# Patient Record
Sex: FEMALE | Race: WHITE | NOT HISPANIC OR LATINO | Employment: OTHER | URBAN - METROPOLITAN AREA
[De-identification: names, ages, dates, MRNs, and addresses within clinical notes are randomized per-mention and may not be internally consistent; named-entity substitution may affect disease eponyms.]

---

## 2023-12-05 ENCOUNTER — OFFICE VISIT (OUTPATIENT)
Dept: GASTROENTEROLOGY | Facility: CLINIC | Age: 73
End: 2023-12-05
Payer: MEDICARE

## 2023-12-05 VITALS
DIASTOLIC BLOOD PRESSURE: 82 MMHG | BODY MASS INDEX: 35.88 KG/M2 | WEIGHT: 243 LBS | OXYGEN SATURATION: 97 % | SYSTOLIC BLOOD PRESSURE: 137 MMHG | TEMPERATURE: 97.9 F | HEART RATE: 68 BPM | RESPIRATION RATE: 20 BRPM

## 2023-12-05 DIAGNOSIS — Z12.11 SCREENING FOR MALIGNANT NEOPLASM OF COLON: ICD-10-CM

## 2023-12-05 DIAGNOSIS — Z80.0 FAMILY HISTORY OF PANCREATIC CANCER: Primary | ICD-10-CM

## 2023-12-05 PROCEDURE — 1159F MED LIST DOCD IN RCRD: CPT | Performed by: INTERNAL MEDICINE

## 2023-12-05 PROCEDURE — 99202 OFFICE O/P NEW SF 15 MIN: CPT | Performed by: INTERNAL MEDICINE

## 2023-12-05 PROCEDURE — 1126F AMNT PAIN NOTED NONE PRSNT: CPT | Performed by: INTERNAL MEDICINE

## 2023-12-05 PROCEDURE — 99212 OFFICE O/P EST SF 10 MIN: CPT | Performed by: INTERNAL MEDICINE

## 2023-12-05 PROCEDURE — 1036F TOBACCO NON-USER: CPT | Performed by: INTERNAL MEDICINE

## 2023-12-05 RX ORDER — ZINC SULFATE 50(220)MG
CAPSULE ORAL
COMMUNITY

## 2023-12-05 RX ORDER — CHOLECALCIFEROL (VITAMIN D3) 125 MCG
CAPSULE ORAL
COMMUNITY

## 2023-12-05 RX ORDER — IBUPROFEN 100 MG/5ML
1000 SUSPENSION, ORAL (FINAL DOSE FORM) ORAL DAILY
COMMUNITY

## 2023-12-05 RX ORDER — FOLIC ACID/MULTIVIT,IRON,MINER 0.4MG-18MG
TABLET ORAL
COMMUNITY

## 2023-12-05 ASSESSMENT — ENCOUNTER SYMPTOMS
OCCASIONAL FEELINGS OF UNSTEADINESS: 0
DEPRESSION: 0
LOSS OF SENSATION IN FEET: 0
GASTROINTESTINAL NEGATIVE: 1
CONSTITUTIONAL NEGATIVE: 1

## 2023-12-05 ASSESSMENT — PAIN SCALES - GENERAL: PAINLEVEL: 0-NO PAIN

## 2023-12-05 NOTE — PATIENT INSTRUCTIONS
I recommend an MRI of your pancreas due to your family history of pancreatic cancer.    Please update your colonoscopy or Cologuard in 2 years.

## 2023-12-05 NOTE — PROGRESS NOTES
"Subjective   Patient ID: Michelle Stokes \"Shelia" is a 73 y.o. female who presents for New Patient Visit.  Here for discussion about risk of pancreatic cancer    Patient and sister both had breast cancer - last occurrence 17 years ago  Genetic testing negative  Sister was not treated with conventional therapy and  at age 43  Both parents  of pancreatic cancer 70s and 90s    Colonoscopy  at [facility] and Cologuard negative last year   No family history CRC           Review of Systems   Constitutional: Negative.    Gastrointestinal: Negative.        Objective   Physical Exam  Constitutional:       Appearance: Normal appearance.   Pulmonary:      Effort: Pulmonary effort is normal.   Abdominal:      Palpations: Abdomen is soft.   Neurological:      Mental Status: She is alert.         Assessment/Plan   Problem List Items Addressed This Visit             ICD-10-CM    Screening for malignant neoplasm of colon Z12.11    Family history of pancreatic cancer - Primary Z80.0    Relevant Orders    MR abdomen wo IV contrast          "

## 2023-12-20 ENCOUNTER — HOSPITAL ENCOUNTER (OUTPATIENT)
Dept: RADIOLOGY | Facility: HOSPITAL | Age: 73
Discharge: HOME | End: 2023-12-20
Payer: MEDICARE

## 2023-12-20 DIAGNOSIS — Z80.0 FAMILY HISTORY OF PANCREATIC CANCER: ICD-10-CM

## 2023-12-20 PROCEDURE — 74181 MRI ABDOMEN W/O CONTRAST: CPT

## 2024-05-01 ENCOUNTER — HOSPITAL ENCOUNTER (OUTPATIENT)
Dept: RADIOLOGY | Facility: HOSPITAL | Age: 74
Discharge: HOME | End: 2024-05-01
Payer: MEDICARE

## 2024-05-01 VITALS — WEIGHT: 240 LBS | HEIGHT: 69 IN | BODY MASS INDEX: 35.55 KG/M2

## 2024-05-01 DIAGNOSIS — N95.1 MENOPAUSAL AND FEMALE CLIMACTERIC STATES: ICD-10-CM

## 2024-05-01 DIAGNOSIS — Z12.31 ENCOUNTER FOR SCREENING MAMMOGRAM FOR MALIGNANT NEOPLASM OF BREAST: ICD-10-CM

## 2024-05-01 PROCEDURE — 77067 SCR MAMMO BI INCL CAD: CPT | Mod: LEFT SIDE | Performed by: RADIOLOGY

## 2024-05-01 PROCEDURE — 77067 SCR MAMMO BI INCL CAD: CPT | Mod: LT

## 2024-05-01 PROCEDURE — 77063 BREAST TOMOSYNTHESIS BI: CPT | Mod: LEFT SIDE | Performed by: RADIOLOGY

## 2024-05-01 PROCEDURE — 77080 DXA BONE DENSITY AXIAL: CPT

## 2025-01-21 ENCOUNTER — HOSPITAL ENCOUNTER (OUTPATIENT)
Dept: RADIOLOGY | Facility: CLINIC | Age: 75
Discharge: HOME | End: 2025-01-21
Payer: MEDICARE

## 2025-01-21 DIAGNOSIS — R92.8 ABNORMAL MAMMOGRAM: ICD-10-CM

## 2025-01-21 PROCEDURE — G0279 TOMOSYNTHESIS, MAMMO: HCPCS | Mod: LEFT SIDE | Performed by: STUDENT IN AN ORGANIZED HEALTH CARE EDUCATION/TRAINING PROGRAM

## 2025-01-21 PROCEDURE — 76642 ULTRASOUND BREAST LIMITED: CPT | Mod: LT

## 2025-01-21 PROCEDURE — 77061 BREAST TOMOSYNTHESIS UNI: CPT | Mod: LT

## 2025-01-21 PROCEDURE — 76982 USE 1ST TARGET LESION: CPT | Mod: LT

## 2025-01-21 PROCEDURE — 77065 DX MAMMO INCL CAD UNI: CPT | Mod: LEFT SIDE | Performed by: STUDENT IN AN ORGANIZED HEALTH CARE EDUCATION/TRAINING PROGRAM

## 2025-01-21 PROCEDURE — 76642 ULTRASOUND BREAST LIMITED: CPT | Mod: LEFT SIDE | Performed by: STUDENT IN AN ORGANIZED HEALTH CARE EDUCATION/TRAINING PROGRAM

## 2025-01-22 NOTE — PROGRESS NOTES
"Michelle Stokes female   1950 74 y.o.   [id number]      Chief Complaint  Abnormal breast imaging     History Of Present Illness  Michelle Stokes \"Shelia" is a 74 y.o. female [facility] for [facility] breast imaging and exam. She has a history of right breast cancer with in breast recurrence. She was initially diagnosed in  and treated at [facility] with right breast partial mastectomy and axillary lymph node dissection. Per patient report, 0/-15 lymph nodes removed, all negative. She did not have adjuvant radiation or chemotherapy and took Tamoxifen for five years. In , she had an in breast recurrence (different quadrant) and was treated at [facility] with a completion skin sparing mastectomy (Eduardo) and tissue expander reconstruction with left breast mastopexy for symmetry (Kingsotn). She does report her recurrence was hormone positive, but she did not take endocrine therapy. She did not have adjuvant radiation or chemotherapy with second diagnosis. She returned to the OR for second stage reconstruction with saline implant that eventually was removed and replaced with a silicone implant. Per patient report, she underwent genetic testing with [facility], negative. She had a screening breast ultrasound performed at [employer] (private company) on 2022, negative. She presents today for left breast imaging follow up. She had a [facility] left breast ultrasound that recommended a biopsy.      BREAST IMAGIN2024 LEFT screening mammogram BI-RADS Category 1.  2025 LEFT diagnostic mammogram and ultrasound 2. Benign focal ductal dilatation with debris in the left subareolar region with no definite intraductal mass or suspicious mammographic or sonographic finding in the left breast. The mammographic appearance has been stable since 2018 and likely represent postsurgical changes of mastopexy. Patient denies nipple discharge. Clinical follow-up is recommended.    REPRODUCTIVE HISTORY:menarche age 14, , first birth " age 27,  x 15 months, no OCP's, surgical menopause age 45 (BSO due to cancer diagnosis), no HRT, scattered fibroglandular tissue                                   FAMILY CANCER HISTORY:   Sister: Breast cancer, age 43,   Mother: Pancreatic cancer, age 80  Father: Pancreatic cancer, 90s     Surgical History  She has a past surgical history that includes Other surgical history (10/27/2017); Other surgical history (10/27/2017); Other surgical history (10/27/2017); Other surgical history (10/27/2017); Other surgical history (10/30/2017); Other surgical history (2019); Other surgical history (2019); Other surgical history (2019); Other surgical history (2019); Breast lumpectomy (Right, 1995); Mastectomy (Right, 2005); Augmentation mammaplasty (99735466); Reduction mammaplasty (439041); Breast biopsy ([id number]); and Oophorectomy (60017535).     Social History  She reports that she has never smoked. She has never used smokeless tobacco. She reports that she does not currently use alcohol. She reports that she does not use drugs.    Family History  Family History   Problem Relation Name Age of Onset    Bilateral breast cancer Sister  43    Other (m aunt br cancer) Other          Allergies  Gabapentin, Neomycin, Neomycin-polymyxin-hc, Tetanus antitoxin, Tetanus toxoid, Tetanus vaccines and toxoid, Niacin, and Nickel    Medications  Current Outpatient Medications   Medication Instructions    ascorbic acid (VITAMIN C) 1,000 mg, Daily    ASHWAGANDHA EXTRACT ORAL 2 capsules, Daily    Bacillus coagulans-inulin 1 billion-250 cell-mg capsule as directed Orally    cholecalciferol (Vitamin D-3) 125 MCG (5000 UT) capsule 1 (one) time each day at the same time.    krill-omega-3-dha-epa-lipids 705-35-30-50 mg capsule Take by mouth.    L. acidophilus/Bifid. animalis 15.5 billion cell capsule Take by mouth.    multivit with minerals/lutein (MULTIVITAMIN 50 PLUS ORAL) Take by mouth.     vitamin D3-vitamin K2 1,250-200 mcg capsule Take by mouth.    zinc sulfate (Zincate) 220 (50 Zn) MG capsule Take by mouth.         REVIEW OF SYSTEMS    Constitutional:  Negative for appetite change, fatigue, fever and unexpected weight change.   HENT:  Negative for ear pain, hearing loss, nosebleeds, sore throat and trouble swallowing.    Eyes:  Negative for discharge, itching and visual disturbance.   Respiratory:  Negative for cough, chest tightness and shortness of breath.    Cardiovascular:  Negative for chest pain, palpitations and leg swelling.   Breast: as indicated in HPI  Gastrointestinal:  Negative for abdominal pain, constipation, diarrhea and nausea.   Endocrine: Negative for cold intolerance and heat intolerance.   Genitourinary:  Negative for dysuria, frequency, hematuria, pelvic pain and vaginal bleeding.   Musculoskeletal:  Negative for arthralgias, back pain, gait problem, joint swelling and myalgias.   Skin:  Negative for color change and rash.   Allergic/Immunologic: Negative for environmental allergies and food allergies.   Neurological:  Negative for dizziness, tremors, speech difficulty, weakness, numbness and headaches.   Hematological:  Does not bruise/bleed easily.   Psychiatric/Behavioral:  Negative for agitation, dysphoric mood and sleep disturbance. The patient is not nervous/anxious.         Past Medical History  She has a past medical history of BRCA1 gene mutation positive ([id number]), BRCA2 gene mutation positive ([id number]), Breast cancer (Multi), Breast cyst (33300133), Disproportion of reconstructed breast, Fibrocystic breast (1980), Personal history of malignant neoplasm of breast (01/08/2019), and Personal history of other specified conditions (11/05/2019).    She has no past medical history of Personal history of irradiation.     Physical Exam  Patient is alert and oriented x3 and in a relaxed and appropriate mood. Her gait is steady and hand grasps are equal. Sclera is clear. The  breasts are asymmetrical, left larger. The right breast and nipple have been removed with well-healed mastectomy incisions and faded nipple tattoo. Right breast with a silicone implant, smooth and mobile. The overlying right breast tissue is soft without palpable abnormalities, discrete nodules or masses. The left breast has a well-healed pedicle method incision. The left breast tissue is soft without palpable abnormalities, discrete nodules or masses. The skin and left nipple appear normal. There is no cervical, supraclavicular or axillary lymphadenopathy. Heart rate and rhythm normal, S1 and S2 appreciated. The lungs are clear to auscultation bilaterally. Abdomen is soft and non-tender.       Physical Exam     Last Recorded Vitals  Vitals:    01/29/25 1031   BP: 141/88   Pulse: 65   Temp: 35.5 °C (95.9 °F)   SpO2: 99%       Relevant Results   Time was spent viewing digital images of the radiology testing with the patient. I explained the results in depth, along with suggested explanation for follow up recommendations based on the testing results. BI-RADS Category 2    Imaging     Source Facility: [facility]     Interpreted By:  Eleazar Edmond,   STUDY:   BI MAMMO LEFT DIAGNOSTIC TOMOSYNTHESIS; BI US BREAST LIMITED LEFT;   1/21/2025 1:51 pm; 1/21/2025 2:58 pm       ACCESSION NUMBER(S):   [id number]; [id number]       ORDERING CLINICIAN:   CARLEE MARIEE       INDICATION:   Evaluation of an intraductal mass at 12 o'clock and prominent cortex   of a left axillary lymph node reported at outside facility screening   ultrasound (HerScan).  Right breast mastectomy.       ,R92.8 Other abnormal and inconclusive findings on diagnostic imaging   of breast       COMPARISON:   Of note images of outside ultrasound are not available for   comparison. Prior mammograms 05/01/2024 and all relevant prior breast   imaging exams available at the time of dictation. Ultrasound   11/26/2018.      FINDINGS:   MAMMOGRAPHY: 2D  and tomosynthesis images were reviewed at 1 mm slice   thickness.      Density:  There are scattered areas of fibroglandular density.       There are postsurgical changes of reduction mastopexy. There is   similar appearance of a focal asymmetry in retroareolar region of the   left breast which may correspond with the mass reported on HerScan.   This has been stable compared to prior exams dating back to 2018. No   additional mammographic abnormalities are identified to correspond   with the findings. A stable nonenlarged lymph node is seen in the   left axilla, similar to prior exams dating back to 2014.       ULTRASOUND: Targeted ultrasound with elastography was performed by a   registered sonographer and the interpreting radiologist in the left   breast and left axilla. Tortuous mildly dilated ducts are again seen   in the subareolar region of the left breast containing debris with no   definite intraductal solid mass or abnormal vascularity identified   within limitations of tortuous ducts. On real-time scanning, mobile   debris are noted in some of the ducts. There is mild increased   stiffness surrounding the dilated duct with no definite increased   stiffness in the intraductal debris. No additional mammographic   abnormality is identified on scanning of the superior central left   breast.      A morphologically normal lymph node with preserved fatty hilum and   normal cortical thickness is identified in the left axilla. No   additional axillary lymph node is identified.       IMPRESSION:   1. Benign focal ductal dilatation with debris in the left subareolar   region with no definite intraductal mass or suspicious mammographic   or sonographic finding in the left breast. The mammographic   appearance has been stable since 2018 and likely represent   postsurgical changes of mastopexy. Patient denies nipple discharge.   Clinical follow-up is recommended.       2. No evidence of left axillary lymphadenopathy.        Dr. Eleazar Edmond discussed the findings and recommendations with   the patient at the time of exam. Patient was encouraged to discuss   her desire for routine supplemental screening with her breast surgery   at her upcoming appointment.       BI-RADS CATEGORY:   BI-RADS Category:  2 Benign.   Recommendation:  Clinical Follow-up and Continued Annual Screening.   Recommended Date:  1 Year.   Laterality:  Left.     Assessment/Plan   Normal clinical breast exam and imaging, history of right breast cancer with in breast recurrence, s/p right mastectomy with reconstruction, BRCA negative. Discussed with patient to continue annual mammograms.            Patient Discussion/Summary    Your clinical examination and imaging are normal. You no longer need to be seen by a breast specialist for an annual physical breast examination. It is important to continue annual screening mammograms and breast exams through your primary care provider. Please return to see me if you have a new breast problem or abnormal mammogram. It has been a pleasure having you as a patient.     You can see your health information, review clinical summaries from office visits & test results online when you follow your health with MY  Chart, a personal health record. To sign up go to [email or web address]. If you need assistance with signing up or trouble getting into your account call [facility] Patient Line 24/7 at [phone].    My office phone number is [phone] if you need to get in touch with me or have additional questions or concerns. Thank you for choosing [facility] and trusting me as your healthcare provider. I am honored to be a provider on your health care team and I remain dedicated to helping you achieve your health goals.         Marisa Fan, NEELAM-CNP

## 2025-01-27 ENCOUNTER — APPOINTMENT (OUTPATIENT)
Dept: RADIOLOGY | Facility: CLINIC | Age: 75
End: 2025-01-27
Payer: MEDICARE

## 2025-01-29 ENCOUNTER — OFFICE VISIT (OUTPATIENT)
Dept: SURGICAL ONCOLOGY | Facility: CLINIC | Age: 75
End: 2025-01-29
Payer: MEDICARE

## 2025-01-29 VITALS
DIASTOLIC BLOOD PRESSURE: 88 MMHG | WEIGHT: 241 LBS | HEART RATE: 65 BPM | TEMPERATURE: 95.9 F | OXYGEN SATURATION: 99 % | BODY MASS INDEX: 35.59 KG/M2 | SYSTOLIC BLOOD PRESSURE: 141 MMHG

## 2025-01-29 DIAGNOSIS — Z85.3 HISTORY OF MALIGNANT NEOPLASM OF RIGHT BREAST: ICD-10-CM

## 2025-01-29 DIAGNOSIS — Z12.31 SCREENING MAMMOGRAM FOR BREAST CANCER: Primary | ICD-10-CM

## 2025-01-29 PROCEDURE — 99214 OFFICE O/P EST MOD 30 MIN: CPT

## 2025-01-29 PROCEDURE — 1036F TOBACCO NON-USER: CPT

## 2025-01-29 PROCEDURE — 1126F AMNT PAIN NOTED NONE PRSNT: CPT

## 2025-01-29 PROCEDURE — 1159F MED LIST DOCD IN RCRD: CPT

## 2025-01-29 ASSESSMENT — PATIENT HEALTH QUESTIONNAIRE - PHQ9
2. FEELING DOWN, DEPRESSED OR HOPELESS: NOT AT ALL
1. LITTLE INTEREST OR PLEASURE IN DOING THINGS: NOT AT ALL
SUM OF ALL RESPONSES TO PHQ9 QUESTIONS 1 & 2: 0

## 2025-01-29 ASSESSMENT — PAIN SCALES - GENERAL: PAINLEVEL_OUTOF10: 0-NO PAIN

## 2025-01-29 NOTE — PATIENT INSTRUCTIONS
Your clinical examination and imaging are normal. You no longer need to be seen by a breast specialist for an annual physical breast examination. It is important to continue annual screening mammograms and breast exams through your primary care provider. Please return to see me if you have a new breast problem or abnormal mammogram. It has been a pleasure having you as a patient.     You can see your health information, review clinical summaries from office visits & test results online when you follow your health with MY  Chart, a personal health record. To sign up go to [email or web address]. If you need assistance with signing up or trouble getting into your account call [facility] [employer] Patient Line 24/7 at [phone].    My office phone number is [phone] if you need to get in touch with me or have additional questions or concerns. Thank you for choosing [facility] and trusting me as your healthcare provider. I am honored to be a provider on your health care team and I remain dedicated to helping you achieve your health goals.

## 2025-07-12 ENCOUNTER — APPOINTMENT (OUTPATIENT)
Dept: AUDIOLOGY | Facility: CLINIC | Age: 75
End: 2025-07-12
Payer: MEDICARE

## 2025-07-12 ENCOUNTER — APPOINTMENT (OUTPATIENT)
Dept: OTOLARYNGOLOGY | Facility: CLINIC | Age: 75
End: 2025-07-12
Payer: MEDICARE

## 2025-08-05 ENCOUNTER — APPOINTMENT (OUTPATIENT)
Dept: AUDIOLOGY | Facility: CLINIC | Age: 75
End: 2025-08-05
Payer: MEDICARE

## 2025-08-05 ENCOUNTER — APPOINTMENT (OUTPATIENT)
Dept: OTOLARYNGOLOGY | Facility: CLINIC | Age: 75
End: 2025-08-05
Payer: MEDICARE

## 2025-09-04 ENCOUNTER — APPOINTMENT (OUTPATIENT)
Dept: AUDIOLOGY | Facility: CLINIC | Age: 75
End: 2025-09-04
Payer: MEDICARE

## 2025-09-04 ENCOUNTER — APPOINTMENT (OUTPATIENT)
Dept: OTOLARYNGOLOGY | Facility: CLINIC | Age: 75
End: 2025-09-04
Payer: MEDICARE